# Patient Record
Sex: FEMALE | Race: WHITE | NOT HISPANIC OR LATINO | URBAN - METROPOLITAN AREA
[De-identification: names, ages, dates, MRNs, and addresses within clinical notes are randomized per-mention and may not be internally consistent; named-entity substitution may affect disease eponyms.]

---

## 2019-05-14 ENCOUNTER — EMERGENCY (EMERGENCY)
Facility: HOSPITAL | Age: 59
LOS: 1 days | Discharge: HOME | End: 2019-05-14
Attending: EMERGENCY MEDICINE
Payer: COMMERCIAL

## 2019-05-14 VITALS
HEART RATE: 109 BPM | OXYGEN SATURATION: 99 % | RESPIRATION RATE: 20 BRPM | SYSTOLIC BLOOD PRESSURE: 149 MMHG | WEIGHT: 149.91 LBS | TEMPERATURE: 98 F | HEIGHT: 61 IN | DIASTOLIC BLOOD PRESSURE: 97 MMHG

## 2019-05-14 PROCEDURE — 99283 EMERGENCY DEPT VISIT LOW MDM: CPT

## 2019-05-14 PROCEDURE — 73564 X-RAY EXAM KNEE 4 OR MORE: CPT | Mod: 26,RT

## 2019-05-14 PROCEDURE — 73564 X-RAY EXAM KNEE 4 OR MORE: CPT

## 2019-05-14 RX ORDER — METHOCARBAMOL 500 MG/1
750 TABLET, FILM COATED ORAL ONCE
Refills: 0 | Status: COMPLETED | OUTPATIENT
Start: 2019-05-14 | End: 2019-05-14

## 2019-05-14 RX ORDER — METHOCARBAMOL 500 MG/1
1 TABLET, FILM COATED ORAL
Qty: 15 | Refills: 0
Start: 2019-05-14 | End: 2019-05-18

## 2019-05-14 RX ORDER — OXYCODONE AND ACETAMINOPHEN 5; 325 MG/1; MG/1
1 TABLET ORAL ONCE
Refills: 0 | Status: DISCONTINUED | OUTPATIENT
Start: 2019-05-14 | End: 2019-05-14

## 2019-05-14 RX ADMIN — OXYCODONE AND ACETAMINOPHEN 1 TABLET(S): 5; 325 TABLET ORAL at 15:08

## 2019-05-14 RX ADMIN — METHOCARBAMOL 750 MILLIGRAM(S): 500 TABLET, FILM COATED ORAL at 15:08

## 2019-05-14 NOTE — ED ADULT NURSE NOTE - NSIMPLEMENTINTERV_GEN_ALL_ED
Implemented All Universal Safety Interventions:  [place] to call system. Call bell, personal items and telephone within reach. Instruct patient to call for assistance. Room bathroom lighting operational. Non-slip footwear when patient is off stretcher. Physically safe environment: no spills, clutter or unnecessary equipment. Stretcher in lowest position, wheels locked, appropriate side rails in place.

## 2019-05-14 NOTE — ED PROVIDER NOTE - PROGRESS NOTE DETAILS
x-ray with antritis of the knee applied the knee immobilizer follow up ortho x-ray with antritis of the knee applied the knee immobilizer follow up ortho  give the xray result to pt x-ray with antritis of the knee applied the knee immobilizer follow up ortho  give the xray result to pt.

## 2019-05-14 NOTE — ED PROVIDER NOTE - CLINICAL SUMMARY MEDICAL DECISION MAKING FREE TEXT BOX
right knee pain as walking heard popped in right knee pain on walking   xray knee and percocet - Robaxin - knee immobilizer - follow up ortho

## 2019-05-14 NOTE — ED PROVIDER NOTE - PHYSICAL EXAMINATION
Right knee :medial compartment mild TTP W.o any erythema or edema or ecchymosis , calf compartment is soft - no TTP , W.o any erythema or edema noted - CR less than2 sec - no ankle swollen - walking with limping on the right limp

## 2019-05-14 NOTE — ED PROVIDER NOTE - ATTENDING CONTRIBUTION TO CARE
The patient seen and examined.  The patient presents with right knee pain    Knee sprain    I, Herb Villa, performed the initial face to face bedside interview with this patient regarding history of present illness, review of symptoms and relevant past medical, social and family history.  I completed an independent physical examination.  I was the initial provider who evaluated this patient. I have signed out the follow up of any pending tests (i.e. labs, radiological studies) to the ACP.  I have communicated the patient’s plan of care and disposition with the ACP.

## 2019-05-14 NOTE — ED ADULT NURSE NOTE - OBJECTIVE STATEMENT
Patient is a 58 year old female, A&Ox4, in no apparent distress.   few days ago patient was just walking, made a turn and heard a snap in her knee. Each day the pain just got worse. Patient took motrin without relief. Patient states she is starting to get nauseous from the pain. Patient is a 58 year old female, A&Ox4, in no apparent distress. Patient states a few days ago she was walking, made a turn and heard a snap in her right knee. Each day the pain has just been getting worse. Patient took Motrin and did not have any relief. Patient states she is starting to get nauseous from the pain.

## 2019-05-14 NOTE — ED PROVIDER NOTE - OBJECTIVE STATEMENT
58 Y./o female No SIg PMh present in Er and  C.o right knee pain  x 2 days s/p as she was walking heard the popped and become more painful that she is taking the ibuprofen with not much helping . pain 9/10radiating to the shin area and worse by walking with [place] N/T in toes . denies any hx of knee injury or direct fall . no other compliant- no recent travel - no cough - no hx of DVT or pE . had US of LE done 7-8 months ago because of the pain and was negative  she is current smoker 58 Y./o female No SIg PMh present in Er and  C.o right knee pain  x 2 days s/p as she was walking heard the popped and become more painful that she is taking the ibuprofen with not much helping . pain 9/10radiating to the shin area and worse by walking with [place] N/T in toes . denies any hx of knee injury or direct fall . no other compliant- no recent travel - no cough - no hx of DVT or PE .she states had US of LE done 7-8 months ago because of the pain inRLE  and was negative  she is current smoker

## 2020-01-27 PROBLEM — Z78.9 OTHER SPECIFIED HEALTH STATUS: Chronic | Status: ACTIVE | Noted: 2019-05-14

## 2020-03-16 ENCOUNTER — APPOINTMENT (OUTPATIENT)
Dept: DERMATOLOGY | Facility: CLINIC | Age: 60
End: 2020-03-16
Payer: COMMERCIAL

## 2020-03-16 PROCEDURE — 99202 OFFICE O/P NEW SF 15 MIN: CPT | Mod: 25

## 2020-03-16 PROCEDURE — 17003 DESTRUCT PREMALG LES 2-14: CPT

## 2020-03-16 PROCEDURE — 17000 DESTRUCT PREMALG LESION: CPT

## 2021-06-09 PROBLEM — Z00.00 ENCOUNTER FOR PREVENTIVE HEALTH EXAMINATION: Status: ACTIVE | Noted: 2021-06-09

## 2023-11-08 NOTE — ED PROVIDER NOTE - CROS ED MUSC ALL NEG
Detail Level: Detailed
Size Of Lesion In Cm (Optional): 0.4
X Size Of Lesion In Cm (Optional): 0
- - -